# Patient Record
Sex: FEMALE | Race: AMERICAN INDIAN OR ALASKA NATIVE
[De-identification: names, ages, dates, MRNs, and addresses within clinical notes are randomized per-mention and may not be internally consistent; named-entity substitution may affect disease eponyms.]

---

## 2020-12-24 ENCOUNTER — HOSPITAL ENCOUNTER (EMERGENCY)
Dept: HOSPITAL 5 - ED | Age: 35
Discharge: HOME | End: 2020-12-24
Payer: MEDICARE

## 2020-12-24 VITALS — SYSTOLIC BLOOD PRESSURE: 107 MMHG | DIASTOLIC BLOOD PRESSURE: 64 MMHG

## 2020-12-24 DIAGNOSIS — Z79.1: ICD-10-CM

## 2020-12-24 DIAGNOSIS — R06.02: ICD-10-CM

## 2020-12-24 DIAGNOSIS — Z79.899: ICD-10-CM

## 2020-12-24 DIAGNOSIS — M79.601: ICD-10-CM

## 2020-12-24 DIAGNOSIS — Z98.890: ICD-10-CM

## 2020-12-24 DIAGNOSIS — Z90.49: ICD-10-CM

## 2020-12-24 DIAGNOSIS — Z88.8: ICD-10-CM

## 2020-12-24 DIAGNOSIS — R07.89: Primary | ICD-10-CM

## 2020-12-24 LAB
BAND NEUTROPHILS # (MANUAL): 0 K/MM3
BILIRUB UR QL STRIP: (no result)
BLOOD UR QL VISUAL: (no result)
BUN SERPL-MCNC: 9 MG/DL (ref 7–17)
BUN/CREAT SERPL: 13 %
CALCIUM SERPL-MCNC: 9.3 MG/DL (ref 8.4–10.2)
HCT VFR BLD CALC: 34.3 % (ref 30.3–42.9)
HEMOLYSIS INDEX: 16
HGB BLD-MCNC: 11.9 GM/DL (ref 10.1–14.3)
MCHC RBC AUTO-ENTMCNC: 35 % (ref 30–34)
MCV RBC AUTO: 90 FL (ref 79–97)
MYELOCYTES # (MANUAL): 0 K/MM3
PH UR STRIP: 6 [PH] (ref 5–7)
PLATELET # BLD: 199 K/MM3 (ref 140–440)
PROMYELOCYTES # (MANUAL): 0 K/MM3
PROT UR STRIP-MCNC: (no result) MG/DL
RBC # BLD AUTO: 3.83 M/MM3 (ref 3.65–5.03)
RBC #/AREA URNS HPF: 2 /HPF (ref 0–6)
TOTAL CELLS COUNTED BLD: 100
UROBILINOGEN UR-MCNC: < 2 MG/DL (ref ?–2)
WBC #/AREA URNS HPF: 5 /HPF (ref 0–6)

## 2020-12-24 PROCEDURE — 93005 ELECTROCARDIOGRAM TRACING: CPT

## 2020-12-24 PROCEDURE — 36415 COLL VENOUS BLD VENIPUNCTURE: CPT

## 2020-12-24 PROCEDURE — 81025 URINE PREGNANCY TEST: CPT

## 2020-12-24 PROCEDURE — 80048 BASIC METABOLIC PNL TOTAL CA: CPT

## 2020-12-24 PROCEDURE — 85025 COMPLETE CBC W/AUTO DIFF WBC: CPT

## 2020-12-24 PROCEDURE — 81001 URINALYSIS AUTO W/SCOPE: CPT

## 2020-12-24 PROCEDURE — 85007 BL SMEAR W/DIFF WBC COUNT: CPT

## 2020-12-24 PROCEDURE — 99284 EMERGENCY DEPT VISIT MOD MDM: CPT

## 2020-12-24 PROCEDURE — 96372 THER/PROPH/DIAG INJ SC/IM: CPT

## 2020-12-24 PROCEDURE — 84484 ASSAY OF TROPONIN QUANT: CPT

## 2020-12-24 PROCEDURE — 71046 X-RAY EXAM CHEST 2 VIEWS: CPT

## 2020-12-24 NOTE — EMERGENCY DEPARTMENT REPORT
ED Chest Pain HPI





- General


Chief Complaint: Chest Pain


Stated Complaint: CHEST PAIN/SOB


Time Seen by Provider: 20 11:34


Source: patient


Mode of arrival: Ambulatory


Limitations: No Limitations





- History of Present Illness


Initial Comments: 





This is a 35-year-old -American female presents to the emergency 

department from home with complaint of some midsternal to right-sided sharp 

chest pains that woke her from sleep at around 5:30 AM this morning.  It 

radiates down the right arm.  Patient feels as if she is short of breath and her

discomfort worsens with breathing.  She denies any fever, nausea, vomiting, back

pain, lower extremity swelling.  She did not take anything for her symptoms 

prior to presentation today.  No recent travel or sick contacts at home.  No 

known exposure to anyone with COVID-19.  She denies any tobacco or illicit drug 

use.  She denies any family history of early cardiac disease or events in her 

nuclear family.  She does not have a primary care physician.  No known 

alleviating factors.





- Related Data


                                  Previous Rx's











 Medication  Instructions  Recorded  Last Taken  Type


 


HYDROcodone/APAP 5-325 [[place] 1 each PO Q6HR PRN #12 tablet 18 Unknown Rx





5/325]    


 


Nitrofurantoin Monohyd/M-Cryst 100 mg PO BID #14 capsule 18 Unknown Rx





[Macrobid 100 mg Capsule]    


 


Promethazine [Phenergan TAB] 25 mg PO Q6HR PRN #10 tab 18 Unknown Rx


 


Clindamycin [Clindamycin CAP] 300 mg PO Q8H 10 Days #30 cap 18 Unknown Rx


 


Ibuprofen [Motrin] 800 mg PO Q8HR PRN #15 tablet 18 Unknown Rx


 


traMADoL [Ultram 50 MG tab] 50 mg PO Q6HR PRN #20 tablet 18 Unknown Rx


 


Albuterol Mdi (or & Nicu Only) 2 puff IH QID PRN #8.5 gram 20 Unknown Rx





[ProAir HFA Inhaler]    


 


predniSONE [Deltasone] 20 mg PO QDAY #3 tab 20 Unknown Rx











                                    Allergies











Allergy/AdvReac Type Severity Reaction Status Date / Time


 


ketorolac [From Toradol] Allergy  Unknown Verified 18 08:26


 


ondansetron Allergy  Unknown Verified 18 08:26





[From Zofran (as     





hydrochloride)]     














Heart Score





- HEART Score


History: Slightly suspicious


EKG: Normal


Age: < 45


Risk factors: No known risk factors


Troponin: < normal limit


HEART Score: 0





- Critical Actions


Critical Actions: 0-3 pts:0.9-1.7%risk of adverse cardiac event.Candidate for 

discharge





ED Review of Systems


ROS: 


Stated complaint: CHEST PAIN/SOB


Other details as noted in HPI





Comment: All other systems reviewed and negative


Constitutional: denies: chills, fever


Eyes: denies: eye pain, vision change


ENT: denies: ear pain, throat pain


Respiratory: shortness of breath.  denies: cough


Cardiovascular: chest pain.  denies: palpitations, edema


Gastrointestinal: denies: abdominal pain, vomiting


Genitourinary: denies: dysuria, discharge


Musculoskeletal: denies: back pain, joint swelling


Skin: denies: rash, lesions


Neurological: denies: headache, weakness





ED Past Medical Hx





- Past Medical History


Previous Medical History?: Yes


Additional medical history: gastroparesis





- Surgical History


Hx Cholecystectomy: Yes


Additional Surgical History: Exp lap, 





- Social History


Smoking Status: Never Smoker





- Medications


Home Medications: 


                                Home Medications











 Medication  Instructions  Recorded  Confirmed  Last Taken  Type


 


HYDROcodone/APAP 5-325 [[place] 1 each PO Q6HR PRN #12 tablet 18  Unknown Rx





5/325]     


 


Nitrofurantoin Monohyd/M-Cryst 100 mg PO BID #14 capsule 18  Unknown Rx





[Macrobid 100 mg Capsule]     


 


Promethazine [Phenergan TAB] 25 mg PO Q6HR PRN #10 tab 18  Unknown Rx


 


Clindamycin [Clindamycin CAP] 300 mg PO Q8H 10 Days #30 cap 18  Unknown Rx


 


Ibuprofen [Motrin] 800 mg PO Q8HR PRN #15 tablet 18  Unknown Rx


 


traMADoL [Ultram 50 MG tab] 50 mg PO Q6HR PRN #20 tablet 18  Unknown Rx


 


Albuterol Mdi (or & Nicu Only) 2 puff IH QID PRN #8.5 gram 20  Unknown Rx





[ProAir HFA Inhaler]     


 


predniSONE [Deltasone] 20 mg PO QDAY #3 tab 20  Unknown Rx














ED Physical Exam





- General


Limitations: No Limitations





- Other


Other exam information: 





GENERAL: The patient is well-developed well-nourished.


HENT: Normocephalic.  Atraumatic.    Patient has moist mucous membranes.


EYES: Extraocular motions are intact.


NECK: Supple. Trachea is midline.


CHEST/LUNGS: Clear to auscultation.  No cough heard during examination.  No 

tachypnea or accessory muscle use.  There is no respiratory distress noted.  

There is reproducible tenderness to palpation along the midsternal to right-

sided chest wall, but no crepitus or deformity.


HEART/CARDIOVASCULAR: Regular.  There is no tachycardia.  There is no murmur.


ABDOMEN: Abdomen is soft, nontender.  Patient has normal bowel sounds.  


SKIN: Skin is warm and dry. 


NEURO: The patient is awake, alert, and oriented.  The patient is cooperative.  

The patient has no focal neurologic deficits.  Normal speech.


MUSCULOSKELETAL: There is no tenderness or deformity.  There is no limitation 

range of motion.  





ED Course


                                   Vital Signs











  20





  07:34


 


Temperature 98.4 F


 


Pulse Rate 82


 


Respiratory 18





Rate 


 


Blood Pressure 107/64


 


O2 Sat by Pulse 96





Oximetry 














JONATHAN score





- Jonathan Score


Age > 65: (0) No


Aspirin use within the Past 7 Days: (0) No


3 or more CAD Risk Factors: (0) No


2 or more Angina events in past 24 hrs: (1) Yes (If pain is considered angina)


Known CAD with more than 50% Stenosis: (0) No


Elevated Cardiac Markers: (0) No


ST Deviation Greater than 0.5mm: (0) No


JONATHAN Score: 1





ED Medical Decision Making





- Lab Data


Result diagrams: 


                                 20 08:41





                                 20 08:41





- EKG Data


-: EKG Interpreted by Me


EKG shows normal: sinus rhythm, axis, intervals, QRS complexes, ST-T waves


Rate: normal





- EKG Data


When compared to previous EKG there are: previous EKG unavailable


Interpretation: normal EKG





- Radiology Data


Radiology results: image reviewed


interpreted by me: 





Chest x-ray does not show any acute process.  There are no pleural effusions, 

obvious pneumonia and there is no pneumothorax. No significant cardiomegaly.





- Medical Decision Making





This patient presents to the emergency department with a complaint of some 

right-sided chest pain with radiation down towards the right arm, as well as 

some shortness of breath, that woke her from sleep this morning.  On examination

 the patient has reproducible tenderness to palpation along the chest wall, 

without any crepitus or deformity.  Heart and lung sounds are clear.  The 

patient does not appear in any respiratory or acute distress.  EKG did not have 

any morphology consistent with ST elevation myocardial infarction or any 

ischemia.  The patient's labs have been mostly unremarkable including CBC, metab

olic panel and a negative troponin.  Chest x-ray did not show any pneumonia, 

pleural effusions, pneumothorax, focal consolidation, or any other acute 

process.  The patient was given a dose of analgesia and steroids.  The patient 

refused a second troponin and is asking for discharge home.  Patient is low on 

the heart and JONATHAN scores.  She is low on the Wells score criteria and negative 

for the pulmonary embolism rule out criteria.





The patient's contact information has been sent over to the [facility] 

vascular [facility], and someone from their office should be contacting her shortly 

for close outpatient follow-up as part of our hospitals low risk chest pain 

protocol.


Critical Care Time: No


Critical care attestation.: 


If time is entered above; I have spent that time in minutes in the direct care 

of this critically ill patient, excluding procedure time.








ED Disposition


Clinical Impression: 


 Atypical chest pain, Chest wall pain





Disposition: DC- TO HOME OR SELFCARE


Is pt being admited?: No


Condition: Stable


Instructions:  Nonspecific Chest Pain, Adult, Chest Wall Pain, Chest Pain (ED)


Additional Instructions: 


Please follow-up with a primary care physician in the next few days.  I have 

sent your contact information over to the [facility], and 

someone from their office should be contacting you shortly for close outpatient 

follow-up.





Return to the emergency department with any worsening of your symptoms, new or 

concerning symptoms not addressed during this current emergency department 

visit, or with any acute distress.


Prescriptions: 


predniSONE [Deltasone] 20 mg PO QDAY #3 tab


Albuterol Mdi (or & Nicu Only) [ProAir HFA Inhaler] 2 puff IH QID PRN #8.5 gram


 PRN Reason: Shortness Of Breath


Referrals: 


GILBERTO GANDARA MD [Primary Care Provider] - 3-5 Days


FRANK WAKEFIELD MD [Staff Physician] - 3-5 Days


Time of Disposition: 12:34

## 2020-12-24 NOTE — XRAY REPORT
CHEST 2 VIEWS



INDICATION / CLINICAL INFORMATION:

Chest pain starting this morning.



COMPARISON: 

None available.



FINDINGS:



SUPPORT DEVICES: None.

HEART / MEDIASTINUM: The heart size and pulmonary vasculature are normal. The aorta is normal in [place]
s[place]. 

LUNGS / PLEURA: No significant pulmonary or pleural abnormality. No pneumothorax. 

ADDITIONAL FINDINGS: There is slight thoracolumbar scoliosis. Surgical clips are present in the upper
 abdomen near the midline.



IMPRESSION: No acute abnormality.



Signer Name: Johnnie Mckenzie MD 

Signed: 12/24/2020 10:00 AM

Workstation Name: [id number]

## 2021-12-19 ENCOUNTER — HOSPITAL ENCOUNTER (EMERGENCY)
Dept: HOSPITAL 5 - ED | Age: 36
End: 2021-12-19
Payer: MEDICARE

## 2021-12-19 VITALS — DIASTOLIC BLOOD PRESSURE: 66 MMHG | SYSTOLIC BLOOD PRESSURE: 88 MMHG

## 2021-12-19 DIAGNOSIS — R06.02: Primary | ICD-10-CM

## 2021-12-19 DIAGNOSIS — Z53.21: ICD-10-CM

## 2021-12-19 LAB
ALBUMIN SERPL-MCNC: 4.9 G/DL (ref 3.9–5)
ALT SERPL-CCNC: 7 UNITS/L (ref 7–56)
BACTERIA #/AREA URNS HPF: (no result) /HPF
BASOPHILS # (AUTO): 0 K/MM3 (ref 0–0.1)
BASOPHILS NFR BLD AUTO: 0.6 % (ref 0–1.8)
BILIRUB UR QL STRIP: (no result)
BLOOD UR QL VISUAL: (no result)
BUN SERPL-MCNC: 6 MG/DL (ref 7–17)
BUN/CREAT SERPL: 8 %
CALCIUM SERPL-MCNC: 10.5 MG/DL (ref 8.4–10.2)
EOSINOPHIL # BLD AUTO: 0 K/MM3 (ref 0–0.4)
EOSINOPHIL NFR BLD AUTO: 0.5 % (ref 0–4.3)
HCT VFR BLD CALC: 43.1 % (ref 30.3–42.9)
HEMOLYSIS INDEX: 98
HGB BLD-MCNC: 13.9 GM/DL (ref 10.1–14.3)
LYMPHOCYTES # BLD AUTO: 0.3 K/MM3 (ref 1.2–5.4)
LYMPHOCYTES NFR BLD AUTO: 6.9 % (ref 13.4–35)
MCHC RBC AUTO-ENTMCNC: 32 % (ref 30–34)
MCV RBC AUTO: 94 FL (ref 79–97)
MONOCYTES # (AUTO): 0.4 K/MM3 (ref 0–0.8)
MONOCYTES % (AUTO): 8.2 % (ref 0–7.3)
PH UR STRIP: 7 [PH] (ref 5–7)
PLATELET # BLD: 121 K/MM3 (ref 140–440)
PROT UR STRIP-MCNC: (no result) MG/DL
RBC # BLD AUTO: 4.6 M/MM3 (ref 3.65–5.03)
RBC #/AREA URNS HPF: 8 /HPF (ref 0–6)
UROBILINOGEN UR-MCNC: < 2 MG/DL (ref ?–2)
WBC #/AREA URNS HPF: 12 /HPF (ref 0–6)

## 2021-12-19 PROCEDURE — 84484 ASSAY OF TROPONIN QUANT: CPT

## 2021-12-19 PROCEDURE — 36415 COLL VENOUS BLD VENIPUNCTURE: CPT

## 2021-12-19 PROCEDURE — 87186 SC STD MICRODIL/AGAR DIL: CPT

## 2021-12-19 PROCEDURE — 87400 INFLUENZA A/B EACH AG IA: CPT

## 2021-12-19 PROCEDURE — 87076 CULTURE ANAEROBE IDENT EACH: CPT

## 2021-12-19 PROCEDURE — 83690 ASSAY OF LIPASE: CPT

## 2021-12-19 PROCEDURE — 71046 X-RAY EXAM CHEST 2 VIEWS: CPT

## 2021-12-19 PROCEDURE — 81001 URINALYSIS AUTO W/SCOPE: CPT

## 2021-12-19 PROCEDURE — 93005 ELECTROCARDIOGRAM TRACING: CPT

## 2021-12-19 PROCEDURE — 85025 COMPLETE CBC W/AUTO DIFF WBC: CPT

## 2021-12-19 PROCEDURE — 87086 URINE CULTURE/COLONY COUNT: CPT

## 2021-12-19 PROCEDURE — 80053 COMPREHEN METABOLIC PANEL: CPT

## 2021-12-19 NOTE — XRAY REPORT
CHEST 2 VIEWS 



INDICATION / CLINICAL INFORMATION:

Chest Pain.



COMPARISON: 

2 views of the chest from 12/24/2020.



FINDINGS:



SUPPORT DEVICES: None.

HEART / MEDIASTINUM: No significant abnormality. 

LUNGS / PLEURA: No significant pulmonary abnormality. No significant pleural effusion. No pneumothora
x. 



ADDITIONAL FINDINGS: No significant additional findings.



IMPRESSION:

1. No acute abnormality of the chest.



Signer Name: Alexander Watson MD 

Signed: 12/19/2021 2:39 AM

Workstation Name: [id number]

## 2021-12-19 NOTE — ELECTROCARDIOGRAPH REPORT
[facility]

                                       

Test Date:    2021               Test Time:    01:14:34

Pat Name:     ALEXIS BARROS          Department:   

Patient ID:   [id number]          Room:          

Gender:       F                        Technician:   bull

:          1985               Requested By: HALIMA COLEY

Order Number: [id number]              Reading MD:   Sanket Gambino

                                 Measurements

Intervals                              Axis          

Rate:         92                       P:            72

WA:           156                      QRS:          21

QRSD:         85                       T:            43

QT:           353                                    

QTc:          436                                    

                           Interpretive Statements

Sinus rhythm

No previous ECG available for comparison

Electronically Signed On 2021 12:03:57 EST by Sanket Gambino